# Patient Record
Sex: FEMALE | Race: WHITE
[De-identification: names, ages, dates, MRNs, and addresses within clinical notes are randomized per-mention and may not be internally consistent; named-entity substitution may affect disease eponyms.]

---

## 2017-03-28 ENCOUNTER — HOSPITAL ENCOUNTER (EMERGENCY)
Dept: HOSPITAL 31 - C.ER | Age: 42
Discharge: HOME | End: 2017-03-28
Payer: COMMERCIAL

## 2017-03-28 VITALS — HEART RATE: 72 BPM | DIASTOLIC BLOOD PRESSURE: 70 MMHG | SYSTOLIC BLOOD PRESSURE: 110 MMHG | RESPIRATION RATE: 18 BRPM

## 2017-03-28 VITALS — TEMPERATURE: 98.8 F | OXYGEN SATURATION: 98 %

## 2017-03-28 VITALS — BODY MASS INDEX: 32 KG/M2

## 2017-03-28 DIAGNOSIS — B37.3: Primary | ICD-10-CM

## 2017-03-28 LAB
BILIRUB UR-MCNC: NEGATIVE MG/DL
GLUCOSE UR STRIP-MCNC: NORMAL MG/DL
KETONES UR STRIP-MCNC: NEGATIVE MG/DL
LEUKOCYTE ESTERASE UR-ACNC: (no result) LEU/UL
PH UR STRIP: 5 [PH] (ref 5–8)
PROT UR STRIP-MCNC: NEGATIVE MG/DL
RBC # UR STRIP: (no result) /UL
RBC #/AREA URNS HPF: 1 /HPF (ref 0–3)
SP GR UR STRIP: 1.02 (ref 1–1.03)
UROBILINOGEN UR-MCNC: NORMAL MG/DL (ref 0.2–1)
WBC #/AREA URNS HPF: < 1 /HPF (ref 0–5)

## 2017-03-28 NOTE — C.PDOC
History Of Present Illness





<Libby Curtis - Last Filed: 03/28/17 11:29>





<ShayMehreen - Last Filed: 03/28/17 11:35>


42 year old female with PMHx of chlamydia presents with complaint of vaginal 

itching and abnormal discharge for 1 week.  The patient states that she applied 

OTC monistat cream for the past 4 days but her symptoms have not improved.  

Patient states that she is currently not sexually active.  She came today 

because her discomfort was too much today.  Also complains of pain with 

urination.  Denies hematuria and pyuria.  Patient had chlamydia one year ago 

and was given oral antibiotics.  She states that she was checked and came back 

negative for GC/chlamydia after treatment.  Patient states her symptoms are 

similar to past event.  Denies fever, chills, ulcer, pain, and change in bowel 

movements. She is s/p total hysterectomy on 12/2016 and denies vaginal 

bleeding.  She last saw her OB/Gyn Dr Roque in 1/2017.   (Libby Curtis)





History Per: Patient


History/Exam Limitations: no limitations


Onset/Duration Of Symptoms: Days


Current Symptoms Are (Timing): Worse


Severity: Severe


Pain Scale Rating Of: 0


Quality Of Discomfort: Burning, Other (itching )


Associated Symptoms: Urinary Symptoms (dysuria).  denies: Fever, Chills


Alleviating Factors: None


Recent travel outside of the [place]: No





<Libby Curtis - Last Filed: 03/28/17 11:29>





<ShayMehreen - Last Filed: 03/28/17 11:35>


Time Seen by Provider: 03/28/17 09:30


Chief Complaint (Nursing): Female Genitourinary





Past Medical History


Reviewed: Historical Data, Nursing Documentation, Vital Signs





- Medical History


PMH: Anemia, Bronchitis, Sexually Transmitted Disease (chlamydia 2016)


Other Surgeries: total abdominal Hysterectomy w/salpingectomy 12/2016


Family History: States: Unknown Family Hx





- Social History


Hx Tobacco Use: No


Hx Alcohol Use: Yes


Hx Substance Use: No





- Immunization History


Hx Tetanus Toxoid Vaccination: No


Hx Influenza Vaccination: No


Hx Pneumococcal Vaccination: No





<GagesurinderMahamedLibby - Last Filed: 03/28/17 11:29>


Vital Signs: 


 Last Vital Signs











Temp  98.8 F   03/28/17 09:19


 


Pulse  81   03/28/17 09:19


 


Resp  16   03/28/17 09:19


 


BP  120/83   03/28/17 09:19


 


Pulse Ox  98   03/28/17 11:34

















- [facility] Procedures








RELEASE UTERINE SUPPORTING STRUCTURE, OPEN APPROACH (12/16/16)


RESECTION OF BILATERAL FALLOPIAN TUBES, OPEN APPROACH (12/16/16)


RESECTION OF CERVIX, OPEN APPROACH (12/16/16)


RESECTION OF UTERUS, OPEN APPROACH (12/16/16)














Review Of Systems


Except As Marked, All Systems Reviewed And Found Negative.


Constitutional: Negative for: Fever, Chills


Gastrointestinal: Negative for: Nausea, Vomiting, Abdominal Pain


Genitourinary: Positive for: Dysuria, Vaginal Discharge.  Negative for: 

Frequency, Incontinence, Hematuria, Vaginal Bleeding, Pelvic Pain





<Libby Curtis - Last Filed: 03/28/17 11:29>





ED Course And Treatment


O2 Sat by Pulse Oximetry: 98


Progress Note: Speculum exam performed with RN chaperone present in the room.  

GC/chlamydia and vaginal culture performed.  PE: cottage-cheese discharge in 

vaginal vault, cervix not present, no blood.  Outer labia normal in appearance, 

no rash/skin changes.


Reevaluation Time: 10:45


Reassessment Condition: Unchanged





<Libby Curtis - Last Filed: 03/28/17 11:29>





Supervising Attending Note





<Libby Curtis - Last Filed: 03/28/17 11:29>





- Supervising Attending Note


Comment: RESIDENT





- Attestation:


I have personally seen and examined this patient.: Yes


I have fully participated in the care of the patient.: Yes


I have reviewed all pertinent clinical information, including history, physical 

exam and plan: Yes





<Mehreen Day - Last Filed: 03/28/17 11:35>





- Notes:


Notes:: 


DYSURIA, VAG DC AND ITCH X 1 WEEK. NO RELIEF W OTC MONISTAT X 4 DAYS. NO FEVER, 

OTHER ASSOC SX. PT DENIES SEX ACTIVE. EXAM AS ABOVE (Mehreen Day)








Disposition


Discussed With : Mehreen Day


Doctor Will See Patient In The: Office


Counseled Patient/Family Regarding: Studies Performed, Diagnosis, Need For 

Followup, Rx Given





- Disposition


Disposition Time: 11:34





- POA


Present On Arrival: None





<Libby Curtis - Last Filed: 03/28/17 11:29>


Counseled Patient/Family Regarding: Studies Performed, Diagnosis, Need For 

Followup, Rx Given





- Disposition


Disposition Time: 11:35





<Mehreen Day - Last Filed: 03/28/17 11:35>





- Disposition


Referrals: 


Magdy Roque [Staff Provider] - 


Disposition: HOME/ ROUTINE


Condition: GOOD


Additional Instructions: 


Patient should follow up with her Ob/Gyn Dr Roque within 7 days.  She was 

instructed to take Diflucan in 72 hours if her symptoms do not improve.  She 

should return to the ED if she experiences worsening of her symptoms or if she 

has any other concerns.  She will be contacted once her vaginal and urine 

results return if abnormal.  Instructions explained to patient who understands.

  


Prescriptions: 


Fluconazole [Diflucan] 150 mg PO ONCE #1 tab


Instructions:  Vulvovaginal Candidiasis (GEN)


Forms:  General Discharge Instructions





- Clinical Impression


Clinical Impression: 


 Candidal vaginitis

## 2017-03-28 NOTE — C.PDOC
Time Seen by Provider: 03/28/17 09:40


Chief Complaint (Nursing): Female Genitourinary


History Per: Patient


History/Exam Limitations: no limitations


Onset/Duration Of Symptoms: Days


Current Symptoms Are (Timing): Worse


Severity: Severe


Pain Scale Rating Of: 3


Quality Of Discomfort: Burning, Other (itching )


Associated Symptoms: Urinary Symptoms (dysuria)


Alleviating Factors: None


Recent travel outside of the [place]: No


Abnormal Vaginal Bleeding: No





Past Medical History


Vital Signs: 





 Last Vital Signs











Temp  98.8 F   03/28/17 09:19


 


Pulse  81   03/28/17 09:19


 


Resp  16   03/28/17 09:19


 


BP  120/83   03/28/17 09:19


 


Pulse Ox  98   03/28/17 09:19














- Medical History


PMH: Anemia, Bronchitis, Sexually Transmitted Disease (chlamydia 2016)


Other Surgeries: total abdominal Hysterectomy w/salpingectomy 12/2016





- [facility] Procedures











RELEASE UTERINE SUPPORTING STRUCTURE, OPEN APPROACH (12/16/16)


RESECTION OF BILATERAL FALLOPIAN TUBES, OPEN APPROACH (12/16/16)


RESECTION OF CERVIX, OPEN APPROACH (12/16/16)


RESECTION OF UTERUS, OPEN APPROACH (12/16/16)








Family History: States: Unknown Family Hx





- Social History


Hx Tobacco Use: No


Hx Alcohol Use: Yes


Hx Substance Use: No





- Immunization History


Hx Tetanus Toxoid Vaccination: No


Hx Influenza Vaccination: No


Hx Pneumococcal Vaccination: No





ED Course And Treatment


O2 Sat by Pulse Oximetry: 98

## 2017-03-31 LAB
BACTERIAL VAGINOSIS VAG-IMP: (no result)
LACTOBACILLUS SPECIES: 6.9

## 2017-08-23 ENCOUNTER — HOSPITAL ENCOUNTER (EMERGENCY)
Dept: HOSPITAL 31 - C.ER | Age: 42
Discharge: HOME | End: 2017-08-23
Payer: COMMERCIAL

## 2017-08-23 VITALS
SYSTOLIC BLOOD PRESSURE: 132 MMHG | DIASTOLIC BLOOD PRESSURE: 78 MMHG | HEART RATE: 87 BPM | TEMPERATURE: 99.2 F | RESPIRATION RATE: 15 BRPM

## 2017-08-23 VITALS — BODY MASS INDEX: 32 KG/M2

## 2017-08-23 VITALS — OXYGEN SATURATION: 98 %

## 2017-08-23 DIAGNOSIS — J06.9: Primary | ICD-10-CM

## 2017-08-23 NOTE — C.PDOC
History Of Present Illness


42 year old female who presents to the ER with a complaint of a sore throat for 

the past 3 days, associated with a subjective fever yesterday. Denies cough, 

nausea, vomiting, or runny nose.


Chief Complaint (Nursing): ENT Problem


History Per: Patient


History/Exam Limitations: no limitations


Onset/Duration Of Symptoms: Days


Current Symptoms Are (Timing): Still Present


Location Of Pain: Throat


Sick Contacts (Context): None


Associated Symptoms: Fever (Subjective), Sore Throat.  denies: Cough, Sinus 

Drainage, Nasal Congestion, Nausea, Vomiting


Ear Symptoms: Bilateral: None


Recent travel outside of the [place]: No





Past Medical History


Reviewed: Historical Data, Nursing Documentation, Vital Signs


Vital Signs: 


 Last Vital Signs











Temp  97.9 F   08/23/17 16:21


 


Pulse  95 H  08/23/17 16:21


 


Resp  20   08/23/17 16:21


 


BP  134/84   08/23/17 16:21


 


Pulse Ox  98   08/23/17 17:32














- Medical History


PMH: Anemia, Bronchitis, Sexually Transmitted Disease (chlamydia 2016)





- [facility] Procedures








RELEASE UTERINE SUPPORTING STRUCTURE, OPEN APPROACH (12/16/16)


RESECTION OF BILATERAL FALLOPIAN TUBES, OPEN APPROACH (12/16/16)


RESECTION OF CERVIX, OPEN APPROACH (12/16/16)


RESECTION OF UTERUS, OPEN APPROACH (12/16/16)








Family History: States: Unknown Family Hx





- Social History


Hx Tobacco Use: No


Hx Alcohol Use: Yes


Hx Substance Use: No





- Immunization History


Hx Tetanus Toxoid Vaccination: No


Hx Influenza Vaccination: No


Hx Pneumococcal Vaccination: No





Review Of Systems


Constitutional: Positive for: Fever (Subjective)


ENT: Positive for: Throat Pain.  Negative for: Nose Discharge, Nose Congestion, 

Throat Swelling


Respiratory: Negative for: Cough


Gastrointestinal: Negative for: Nausea, Vomiting





Physical Exam





- Physical Exam


Appears: Non-toxic, No Acute Distress


Skin: Normal Color, Warm, Dry


Head: Atraumatic, Normacephalic


Ear(s): Bilateral: Normal


Oral Mucosa: Moist


Throat: Normal, No Erythema, No Exudate


Neck: Normal, Supple


Lymphatic: Adenopathy


Chest: Symmetrical, No Tenderness


Cardiovascular: Rhythm Regular, No Murmur


Respiratory: Normal Breath Sounds, No Rales, No Rhonchi, No Wheezing


Gastrointestinal/Abdominal: Soft, No Tenderness


Neurological/Psych: Oriented x3, Normal Speech, Normal Cognition





ED Course And Treatment


O2 Sat by Pulse Oximetry: 98 (Room air)


Pulse Ox Interpretation: Normal


Progress Note: Motrin and zithromax administered. On reevaluation, patient's 

feels better, will discharge with instructions to follow up with PMD.





Disposition





- Disposition


Disposition: HOME/ ROUTINE


Disposition Time: 18:19


Condition: STABLE


Additional Instructions: 


Follow up with PMD within 1-2 days. Return to Ed if feel worse.


Prescriptions: 


Fluticasone Nasal [Flonase] 1 spr NS BID #1 spr


Ibuprofen [Motrin Tab] 600 mg PO Q8 #30 tab


Azithromycin [Zithromax] 250 mg PO DAILY #4 tab


Instructions:  Upper Respiratory Infection (ED)


Forms:  [facility] (English)





- Clinical Impression


Clinical Impression: 


 URI (upper respiratory infection)








- Scribe Statement


The provider has reviewed the documentation as recorded by the Scribe





John Rodriguez





All medical record entries made by the Scribe were at my direction and 

personally dictated by me. I have reviewed the chart and agree that the record 

accurately reflects my personal performance of the history, physical exam, 

medical decision making, and the department course for this patient. I have 

also personally directed, reviewed, and agree with the discharge instructions 

and disposition.

## 2017-12-01 ENCOUNTER — HOSPITAL ENCOUNTER (EMERGENCY)
Dept: HOSPITAL 31 - C.ER | Age: 42
Discharge: HOME | End: 2017-12-01
Payer: COMMERCIAL

## 2017-12-01 VITALS
SYSTOLIC BLOOD PRESSURE: 133 MMHG | HEART RATE: 76 BPM | RESPIRATION RATE: 18 BRPM | DIASTOLIC BLOOD PRESSURE: 85 MMHG | TEMPERATURE: 98.2 F

## 2017-12-01 VITALS — OXYGEN SATURATION: 99 %

## 2017-12-01 VITALS — BODY MASS INDEX: 32 KG/M2

## 2017-12-01 DIAGNOSIS — N83.202: ICD-10-CM

## 2017-12-01 DIAGNOSIS — R51: Primary | ICD-10-CM

## 2017-12-01 DIAGNOSIS — Z90.710: ICD-10-CM

## 2017-12-01 LAB
ALBUMIN/GLOB SERPL: 1.4 {RATIO} (ref 1–2.1)
ALP SERPL-CCNC: 52 U/L (ref 38–126)
ALT SERPL-CCNC: 47 U/L (ref 9–52)
AST SERPL-CCNC: 29 U/L (ref 14–36)
BASOPHILS # BLD AUTO: 0.1 K/UL (ref 0–0.2)
BASOPHILS NFR BLD: 0.8 % (ref 0–2)
BILIRUB SERPL-MCNC: 0.9 MG/DL (ref 0.2–1.3)
BILIRUB UR-MCNC: NEGATIVE MG/DL
BUN SERPL-MCNC: 8 MG/DL (ref 7–17)
CALCIUM SERPL-MCNC: 8.8 MG/DL (ref 8.6–10.4)
CHLORIDE SERPL-SCNC: 104 MMOL/L (ref 98–107)
CO2 SERPL-SCNC: 25 MMOL/L (ref 22–30)
EOSINOPHIL # BLD AUTO: 0.1 K/UL (ref 0–0.7)
EOSINOPHIL NFR BLD: 1.6 % (ref 0–4)
ERYTHROCYTE [DISTWIDTH] IN BLOOD BY AUTOMATED COUNT: 14.8 % (ref 11.5–14.5)
GLOBULIN SER-MCNC: 2.9 GM/DL (ref 2.2–3.9)
GLUCOSE SERPL-MCNC: 92 MG/DL (ref 65–105)
GLUCOSE UR STRIP-MCNC: NORMAL MG/DL
HCT VFR BLD CALC: 42.4 % (ref 34–47)
KETONES UR STRIP-MCNC: NEGATIVE MG/DL
LEUKOCYTE ESTERASE UR-ACNC: (no result) LEU/UL
LYMPHOCYTES # BLD AUTO: 2 K/UL (ref 1–4.3)
LYMPHOCYTES NFR BLD AUTO: 23 % (ref 20–40)
MCH RBC QN AUTO: 28.9 PG (ref 27–31)
MCHC RBC AUTO-ENTMCNC: 33.4 G/DL (ref 33–37)
MCV RBC AUTO: 86.5 FL (ref 81–99)
MONOCYTES # BLD: 0.7 K/UL (ref 0–0.8)
MONOCYTES NFR BLD: 8.2 % (ref 0–10)
NRBC BLD AUTO-RTO: 0.1 % (ref 0–2)
PH UR STRIP: 5 [PH] (ref 5–8)
PLATELET # BLD: 309 K/UL (ref 130–400)
PMV BLD AUTO: 7.9 FL (ref 7.2–11.7)
POTASSIUM SERPL-SCNC: 4.7 MMOL/L (ref 3.6–5.2)
PROT SERPL-MCNC: 7.1 G/DL (ref 6.3–8.3)
PROT UR STRIP-MCNC: NEGATIVE MG/DL
RBC # UR STRIP: NEGATIVE /UL
SODIUM SERPL-SCNC: 137 MMOL/L (ref 132–148)
SP GR UR STRIP: 1.01 (ref 1–1.03)
UROBILINOGEN UR-MCNC: NORMAL MG/DL (ref 0.2–1)
WBC # BLD AUTO: 8.7 K/UL (ref 4.8–10.8)
WBC #/AREA URNS HPF: < 1 /HPF (ref 0–5)

## 2017-12-01 PROCEDURE — 99285 EMERGENCY DEPT VISIT HI MDM: CPT

## 2017-12-01 PROCEDURE — 84703 CHORIONIC GONADOTROPIN ASSAY: CPT

## 2017-12-01 PROCEDURE — 80053 COMPREHEN METABOLIC PANEL: CPT

## 2017-12-01 PROCEDURE — 87086 URINE CULTURE/COLONY COUNT: CPT

## 2017-12-01 PROCEDURE — 85025 COMPLETE CBC W/AUTO DIFF WBC: CPT

## 2017-12-01 PROCEDURE — 96360 HYDRATION IV INFUSION INIT: CPT

## 2017-12-01 PROCEDURE — 76856 US EXAM PELVIC COMPLETE: CPT

## 2017-12-01 PROCEDURE — 76830 TRANSVAGINAL US NON-OB: CPT

## 2017-12-01 PROCEDURE — 81001 URINALYSIS AUTO W/SCOPE: CPT

## 2017-12-01 NOTE — US
HISTORY:

pelvic pain



COMPARISON:

None available.



TECHNIQUE:

Transabdominal and transvaginal



FINDINGS:



UTERUS:

Status post hysterectomy 



ENDOMETRIUM:

Status post hysterectomy 



CERVIX:

Status post hysterectomy 



RIGHT OVARY:

Measures 3.1 x 2.3 x 3.2 cm. No solid mass. Normal flow. 



LEFT OVARY:

Measures 4.3 x 3.7 x 3.7 cm. No solid mass. Normal flow. Complex 

septated cyst measuring 2.9 x 2.7 x 2.5 cm. Recommend followup 

transvaginal pelvic ultrasound examination in 6-8 weeks.  Likely 

hemorrhagic cyst.



FREE FLUID:

No significant free fluid noted.



OTHER FINDINGS:

None. 



IMPRESSION:

Complex 2.9 cm left ovarian septated cyst.  Likely hemorrhagic cyst.  

Followup transvaginal pelvic ultrasound examination is advised in 6-8 

weeks.  Status post hysterectomy.

## 2017-12-01 NOTE — C.PDOC
History Of Present Illness


42 year year old female presents to ED for evaluation of persistent lower 

abdominal pain, lower back pain, and dysuria for the past several days. Pt also 

complaints of headache  since this morning. Notes having subjective fever 

yesterday. Notes being seen here several weeks ago for similar symptoms and was 

diagnosed with UTI. Denies history of migraine, blurred vision, photophobia, 

nausea, vomiting, change in sensation, dizziness, or any other associated 

symptoms at this time.





Chief Complaint (Nursing): Female Genitourinary


History Per: Patient


History/Exam Limitations: no limitations


Onset/Duration Of Symptoms: Days


Current Symptoms Are (Timing): Still Present


Quality Of Discomfort: "Pain"


Associated Symptoms: Fever, Back Pain, Urinary Symptoms.  denies: Nausea, 

Vomiting, Diarrhea, Loss Of Appetite, Constipation


Alleviating Factors: None


Recent travel outside of the [place]: No


Additional History Per: Patient


Abnormal Vaginal Bleeding: No





Past Medical History


Reviewed: Historical Data, Nursing Documentation, Vital Signs


Vital Signs: 


 Last Vital Signs











Temp  98.2 F   12/01/17 11:56


 


Pulse  76   12/01/17 11:56


 


Resp  18   12/01/17 11:56


 


BP  133/85   12/01/17 11:56


 


Pulse Ox  99   12/01/17 11:56














- Medical History


PMH: Anemia, Bronchitis, Sexually Transmitted Disease (chlamydia 2016)





- [facility] Procedures








RELEASE UTERINE SUPPORTING STRUCTURE, OPEN APPROACH (12/16/16)


RESECTION OF BILATERAL FALLOPIAN TUBES, OPEN APPROACH (12/16/16)


RESECTION OF CERVIX, OPEN APPROACH (12/16/16)


RESECTION OF UTERUS, OPEN APPROACH (12/16/16)








Family History: States: Unknown Family Hx





- Social History


Hx Tobacco Use: No


Hx Alcohol Use: Yes


Hx Substance Use: No





- Immunization History


Hx Tetanus Toxoid Vaccination: No


Hx Influenza Vaccination: No


Hx Pneumococcal Vaccination: No





Review Of Systems


Except As Marked, All Systems Reviewed And Found Negative.


Constitutional: Positive for: Fever.  Negative for: Chills


Gastrointestinal: Positive for: Abdominal Pain.  Negative for: Nausea, Vomiting


Genitourinary: Positive for: Dysuria.  Negative for: Frequency, Hematuria


Musculoskeletal: Positive for: Back Pain


Neurological: Positive for: Headache.  Negative for: Weakness, Numbness, 

Dizziness





Physical Exam





- Physical Exam


Appears: Non-toxic, No Acute Distress


Skin: Normal Color, Warm, Dry


Head: Atraumatic, Normacephalic


Eye(s): bilateral: Normal Inspection, PERRL, EOMI


Oral Mucosa: Moist


Neck: Normal ROM, Supple


Cardiovascular: Rhythm Regular, No Murmur


Respiratory: Normal Breath Sounds, No Rales, No Rhonchi, No Wheezing


Gastrointestinal/Abdominal: Soft, No Tenderness, No Guarding, No Rebound


Back: Normal Inspection, No CVA Tenderness


Extremity: Normal ROM, No Swelling


Neurological/Psych: Oriented x3, Normal Speech, Normal Cognition, Normal Motor, 

Normal Sensation





ED Course And Treatment





- Laboratory Results


Result Diagrams: 


 12/01/17 09:29





 12/01/17 09:29


O2 Sat by Pulse Oximetry: 99 (RA)


Pulse Ox Interpretation: Normal





- CT Scan/US


  ** Pelvis ultrasound


Other Rad Studies (CT/US): Read By Radiologist, Radiology Report Reviewed


CT/US Interpretation: Accession No. : [id number].  Patient Name / ID : 

SÁNCHEZ GORDON  / [id number].  Exam Date : 12/01/2017 10:07:03 ( Approved ).  

Study Comment :  Sex / Age : F  / 42Y.  Creator : Manpreet Murillo MD.  

Dictator : Manpreet Murillo MD.   :  Approver : Manpreet Murillo MD.  Approver2 :  Report Date : 12/01/2017 10:40:36.  My Comment :  ******

*****************************************************************************.  

HISTORY:  pelvic pain.  COMPARISON:  None available.  TECHNIQUE:  

Transabdominal and transvaginal.  FINDINGS:  UTERUS:  Status post hysterectomy.

  ENDOMETRIUM:  Status post hysterectomy.  CERVIX:  Status post hysterectomy.  

RIGHT OVARY:  Measures 3.1 x 2.3 x 3.2 cm. No solid mass. Normal flow.  LEFT 

OVARY:  Measures 4.3 x 3.7 x 3.7 cm. No solid mass. Normal flow. Complex 

septated cyst measuring 2.9 x 2.7 x 2.5 cm. Recommend followup transvaginal 

pelvic ultrasound examination in 6-8 weeks.  Likely hemorrhagic cyst.  FREE 

FLUID:  No significant free fluid noted.  OTHER FINDINGS:  None.  IMPRESSION:  

Complex 2.9 cm left ovarian septated cyst.  Likely hemorrhagic cyst.  Followup 

transvaginal pelvic ultrasound examination is advised in 6-8 weeks.  Status 

post hysterectomy.


Progress Note: Blood work, UA, pelvis ultrasound ordered and reviewed. Pt was 

given Reglan. On reassessment, patient is resting comfortably, with improvement 

of headache, abdominal and  back pain. Patient remains afebrile, with no 

meningeal signs, bony tenderness, extremity numbness or weakness. Abdomen 

remains soft and non-tender. Patient has no neurologic deficit. Patient is 

being discharged home and is being advised to follow up with physician/clinic 

in 1-2 days.





Disposition





- Disposition


Referrals: 


Magdy Roque [Staff Provider] - 


Disposition: HOME/ ROUTINE


Disposition Time: 11:49


Condition: STABLE


Additional Instructions: 


Follow up with your OBGYN  within 1-2 days. Return to ED if feel 

worse.


Prescriptions: 


Naproxen [Naprosyn] 1 tab PO BID PRN #25 tab


 PRN Reason: Pain


Instructions:  Ovarian Cyst (ED), General Headache (ED)


Forms:  [facility] (English)





- Clinical Impression


Clinical Impression: 


 Ovarian cyst, Headache








- PA / NP / Resident Statement


MD/DO has reviewed & agrees with the documentation as recorded.





- Scribe Statement


The provider has reviewed the documentation as recorded by the Cortneyibkasia Carroll





All medical record entries made by the Cortneyibkasia were at my direction and 

personally dictated by me. I have reviewed the chart and agree that the record 

accurately reflects my personal performance of the history, physical exam, 

medical decision making, and the department course for this patient. I have 

also personally directed, reviewed, and agree with the discharge instructions 

and disposition.

## 2018-05-04 ENCOUNTER — HOSPITAL ENCOUNTER (EMERGENCY)
Dept: HOSPITAL 31 - C.ER | Age: 43
Discharge: HOME | End: 2018-05-04
Payer: COMMERCIAL

## 2018-05-04 VITALS — BODY MASS INDEX: 32.9 KG/M2

## 2018-05-04 VITALS — TEMPERATURE: 97.7 F | OXYGEN SATURATION: 100 %

## 2018-05-04 VITALS — SYSTOLIC BLOOD PRESSURE: 143 MMHG | DIASTOLIC BLOOD PRESSURE: 81 MMHG | RESPIRATION RATE: 18 BRPM | HEART RATE: 58 BPM

## 2018-05-04 DIAGNOSIS — K57.32: Primary | ICD-10-CM

## 2018-05-04 LAB
ALBUMIN SERPL-MCNC: 3.8 G/DL (ref 3.5–5)
ALBUMIN/GLOB SERPL: 1.1 {RATIO} (ref 1–2.1)
ALT SERPL-CCNC: 23 U/L (ref 9–52)
AST SERPL-CCNC: 28 U/L (ref 14–36)
BASOPHILS # BLD AUTO: 0.1 K/UL (ref 0–0.2)
BASOPHILS NFR BLD: 0.7 % (ref 0–2)
BILIRUB UR-MCNC: NEGATIVE MG/DL
BUN SERPL-MCNC: 9 MG/DL (ref 7–17)
CALCIUM SERPL-MCNC: 8.7 MG/DL (ref 8.6–10.4)
EOSINOPHIL # BLD AUTO: 0.2 K/UL (ref 0–0.7)
EOSINOPHIL NFR BLD: 1.7 % (ref 0–4)
ERYTHROCYTE [DISTWIDTH] IN BLOOD BY AUTOMATED COUNT: 15.7 % (ref 11.5–14.5)
GFR NON-AFRICAN AMERICAN: > 60
GLUCOSE UR STRIP-MCNC: NORMAL MG/DL
HCG,QUALITATIVE URINE: NEGATIVE
HGB BLD-MCNC: 13.9 G/DL (ref 11–16)
LEUKOCYTE ESTERASE UR-ACNC: (no result) LEU/UL
LIPASE: 467 U/L (ref 23–300)
LYMPHOCYTES # BLD AUTO: 2 K/UL (ref 1–4.3)
LYMPHOCYTES NFR BLD AUTO: 20.2 % (ref 20–40)
MCH RBC QN AUTO: 29.6 PG (ref 27–31)
MCHC RBC AUTO-ENTMCNC: 34.8 G/DL (ref 33–37)
MCV RBC AUTO: 85.1 FL (ref 81–99)
MONOCYTES # BLD: 0.8 K/UL (ref 0–0.8)
MONOCYTES NFR BLD: 7.7 % (ref 0–10)
NEUTROPHILS # BLD: 7.1 K/UL (ref 1.8–7)
NEUTROPHILS NFR BLD AUTO: 69.7 % (ref 50–75)
NRBC BLD AUTO-RTO: 0.1 % (ref 0–2)
PH UR STRIP: 6 [PH] (ref 5–8)
PLATELET # BLD: 325 K/UL (ref 130–400)
PMV BLD AUTO: 7.6 FL (ref 7.2–11.7)
PROT UR STRIP-MCNC: NEGATIVE MG/DL
RBC # BLD AUTO: 4.71 MIL/UL (ref 3.8–5.2)
RBC # UR STRIP: (no result) /UL
SP GR UR STRIP: 1.02 (ref 1–1.03)
SQUAMOUS EPITHIAL: 5 /HPF (ref 0–5)
UROBILINOGEN UR-MCNC: NORMAL MG/DL (ref 0.2–1)
WBC # BLD AUTO: 10.1 K/UL (ref 4.8–10.8)

## 2018-05-04 PROCEDURE — 83690 ASSAY OF LIPASE: CPT

## 2018-05-04 PROCEDURE — 99285 EMERGENCY DEPT VISIT HI MDM: CPT

## 2018-05-04 PROCEDURE — 96375 TX/PRO/DX INJ NEW DRUG ADDON: CPT

## 2018-05-04 PROCEDURE — 96365 THER/PROPH/DIAG IV INF INIT: CPT

## 2018-05-04 PROCEDURE — 81001 URINALYSIS AUTO W/SCOPE: CPT

## 2018-05-04 PROCEDURE — 80053 COMPREHEN METABOLIC PANEL: CPT

## 2018-05-04 PROCEDURE — 84703 CHORIONIC GONADOTROPIN ASSAY: CPT

## 2018-05-04 PROCEDURE — 85025 COMPLETE CBC W/AUTO DIFF WBC: CPT

## 2018-05-04 PROCEDURE — 74177 CT ABD & PELVIS W/CONTRAST: CPT

## 2018-05-04 NOTE — CT
PROCEDURE:  CT Abdomen and Pelvis with contrast



HISTORY:

pain



COMPARISON:

None.



TECHNIQUE:

Contrast dose: 100 mL Visipaque 320



Radiation dose:



Total exam DLP = 1113.14 mGy-cm.



This CT exam was performed using one or more of the following dose 

reduction techniques: Automated exposure control, adjustment of the 

mA and/or kV according to patient size, and/or use of iterative 

reconstruction technique.



FINDINGS:



LOWER THORAX:

Unremarkable. 



LIVER:

Unremarkable. No gross lesion or ductal dilatation. 



GALLBLADDER AND BILE DUCTS:

Unremarkable. 



PANCREAS:

Unremarkable. No gross lesion or ductal dilatation.



SPLEEN:

Unremarkable. 



ADRENALS:

Unremarkable. No mass. 



KIDNEYS AND URETERS:

Cortical scar upper pole right kidney. Nonspecific. No renal mass, 

calculus or hydronephrosis. 



VASCULATURE:

Unremarkable. No aortic aneurysm. 



BOWEL:

Acute diverticulitis of the sigmoid colon.  Diverticulosis of the 

descending and sigmoid colon. No pericolonic abscess. No 

pneumoperitoneum. No other abnormal bowel loops. 



APPENDIX:

Normal appendix. 



PERITONEUM:

Unremarkable. No free fluid. No free air. 



LYMPH NODES:

Unremarkable. No enlarged lymph nodes. 



BLADDER:

Grossly normal.  Suboptimally distended. 



REPRODUCTIVE:

Status post hysterectomy. 



BONES:

No acute fracture. 



OTHER FINDINGS:

None.



IMPRESSION:

Acute diverticulitis of the sigmoid colon. No abscess. Diverticulosis 

of the descending and sigmoid colon. No pneumoperitoneum. Status post 

hysterectomy.  No additional abnormality.

## 2018-05-04 NOTE — C.PDOC
History Of Present Illness


44 y/o female presents to ED with c/o left sided abdominal pain associated with 

diarrhea and vomiting since yesterday. Patient states she had similar episode 

on 12/2017 and was diagnosed with ovarian cyst. Patient denies fever, back pain

, dysuria, urinary frequency,  vaginal bleeding, vaginal discharge or any other 

complaints at this time. 


Time Seen by Provider: 05/04/18 07:24


Chief Complaint (Nursing): Abdominal Pain


History Per: Patient


History/Exam Limitations: no limitations


Onset/Duration Of Symptoms: Days


Current Symptoms Are (Timing): Still Present


Location Of Pain/Discomfort: Diffuse





Past Medical History


Reviewed: Historical Data, Nursing Documentation, Vital Signs


Vital Signs: 


 Last Vital Signs











Temp  97.7 F   05/04/18 07:08


 


Pulse  58 L  05/04/18 11:03


 


Resp  18   05/04/18 11:03


 


BP  143/81   05/04/18 11:03


 


Pulse Ox  100   05/04/18 11:44














- Medical History


PMH: Anemia, Bronchitis, Sexually Transmitted Disease (chlamydia 2016)


Surgical History: No Surg Hx





- [facility] Procedures








RELEASE UTERINE SUPPORTING STRUCTURE, OPEN APPROACH (12/16/16)


RESECTION OF BILATERAL FALLOPIAN TUBES, OPEN APPROACH (12/16/16)


RESECTION OF CERVIX, OPEN APPROACH (12/16/16)


RESECTION OF UTERUS, OPEN APPROACH (12/16/16)








Family History: States: No Known Family Hx





- Social History


Hx Tobacco Use: No


Hx Alcohol Use: Yes


Hx Substance Use: No





- Immunization History


Hx Tetanus Toxoid Vaccination: No


Hx Influenza Vaccination: No


Hx Pneumococcal Vaccination: No





Review Of Systems


Constitutional: Negative for: Fever, Chills


Gastrointestinal: Positive for: Vomiting, Abdominal Pain, Diarrhea


Genitourinary: Negative for: Vaginal Discharge, Vaginal Bleeding


Skin: Negative for: Rash





Physical Exam





- Physical Exam


Appears: Non-toxic, No Acute Distress


Skin: Warm, Dry, No Rash


Head: Atraumatic, Normacephalic


Eye(s): bilateral: Normal Inspection, EOMI


Nose: Normal


Oral Mucosa: Moist


Neck: Normal ROM, Supple


Chest: Symmetrical


Cardiovascular: Rhythm Regular


Respiratory: Normal Breath Sounds, No Accessory Muscle Use, No Rales, No Rhonchi

, No Wheezing


Gastrointestinal/Abdominal: Soft, Tenderness (LLQ), No Guarding, No Rebound


Back: No CVA Tenderness, No Vertebral Tenderness


Extremity: Normal ROM, Capillary Refill (<2 seconds)


Neurological/Psych: Oriented x3, Normal Speech, Normal Cognition





ED Course And Treatment





- Laboratory Results


Result Diagrams: 


 05/04/18 08:06





 05/04/18 08:06


O2 Sat by Pulse Oximetry: 100 (RA)


Pulse Ox Interpretation: Normal





- CT Scan/US


  ** CT ABD/PEL


Other Rad Studies (CT/US): Read By Radiologist, Radiology Report Reviewed


CT/US Interpretation: PROCEDURE:  CT Abdomen and Pelvis with contrast.  HISTORY

:  pain.  COMPARISON:  None.  TECHNIQUE:  Contrast dose: 100 mL Visipaque 320.  

Radiation dose:  Total exam DLP = 1113.14 mGy-cm.  This CT exam was performed 

using one or more of the following dose reduction techniques: Automated 

exposure control, adjustment of the mA and/or kV according to patient size, and/

or use of iterative reconstruction technique.  FINDINGS:  LOWER THORAX:  

Unremarkable.  LIVER:  Unremarkable. No gross lesion or ductal dilatation.  

GALLBLADDER AND BILE DUCTS:  Unremarkable.  PANCREAS:  Unremarkable. No gross 

lesion or ductal dilatation.  SPLEEN:  Unremarkable.  ADRENALS:  Unremarkable. 

No mass.  KIDNEYS AND URETERS:  Cortical scar upper pole right kidney. 

Nonspecific. No renal mass, calculus or hydronephrosis.  VASCULATURE:  

Unremarkable. No aortic aneurysm.


Progress Note: Blood work, PO challenge ordered. Cipro, Metronidazole IV, 

Toradol and Zofran administered.  On re eval pt feels better. PO challenge 

ordered.  On re-evlauation, pt notes pain improved. Remains afebrile. 

TOlerating PO. Abdomen soft. WBC WNL. Discussed with pt signs and symptoms of 

concern to returnt o ER if symtpoms persist or worsen. Instructed to f/u with 

GI specialist in 1-2 days.





Disposition





- Disposition


Referrals: 


Maru Flores [Staff Provider] - 


Disposition: HOME/ ROUTINE


Disposition Time: 10:48


Condition: STABLE


Additional Instructions: 


Follow up with your PMD/GI specialist in 1-2 days. If you get a fever, bloody 

stool, vomiting, or abdomen pain worsens return right awaye. Return to ER if 

symptoms persist or worsen. 


Prescriptions: 


Ciprofloxacin HCl [Cipro] 500 mg PO BID #14 tab


Metronidazole [Flagyl] 500 mg PO BID #14 tab


Instructions:  Diverticulitis (DC)


Forms:  [facility] (English)





- Clinical Impression


Clinical Impression: 


 Diverticulitis








- PA / NP / Resident Statement


MD/DO has reviewed & agrees with the documentation as recorded.





- Scribe Statement


The provider has reviewed the documentation as recorded by the Cortneyibkasia Holt





All medical record entries made by the La were at my direction and 

personally dictated by me. I have reviewed the chart and agree that the record 

accurately reflects my personal performance of the history, physical exam, 

medical decision making, and the department course for this patient. I have 

also personally directed, reviewed, and agree with the discharge instructions 

and disposition.